# Patient Record
Sex: MALE | Race: ASIAN | Employment: FULL TIME | URBAN - METROPOLITAN AREA
[De-identification: names, ages, dates, MRNs, and addresses within clinical notes are randomized per-mention and may not be internally consistent; named-entity substitution may affect disease eponyms.]

---

## 2018-08-22 ENCOUNTER — HOSPITAL ENCOUNTER (EMERGENCY)
Facility: CLINIC | Age: 33
Discharge: HOME | End: 2018-08-22
Attending: EMERGENCY MEDICINE | Admitting: EMERGENCY MEDICINE
Payer: COMMERCIAL

## 2018-08-22 ENCOUNTER — APPOINTMENT (OUTPATIENT)
Dept: GENERAL RADIOLOGY | Facility: CLINIC | Age: 33
End: 2018-08-22
Attending: EMERGENCY MEDICINE
Payer: COMMERCIAL

## 2018-08-22 VITALS
RESPIRATION RATE: 18 BRPM | HEART RATE: 60 BPM | SYSTOLIC BLOOD PRESSURE: 120 MMHG | OXYGEN SATURATION: 99 % | TEMPERATURE: 97.8 F | DIASTOLIC BLOOD PRESSURE: 80 MMHG

## 2018-08-22 DIAGNOSIS — R07.9 ACUTE CHEST PAIN: ICD-10-CM

## 2018-08-22 DIAGNOSIS — M54.6 ACUTE LEFT-SIDED THORACIC BACK PAIN: ICD-10-CM

## 2018-08-22 LAB
ALBUMIN SERPL-MCNC: 4.3 G/DL (ref 3.4–5)
ALP SERPL-CCNC: 52 U/L (ref 40–150)
ALT SERPL W P-5'-P-CCNC: 33 U/L (ref 0–70)
ANION GAP SERPL CALCULATED.3IONS-SCNC: 4 MMOL/L (ref 3–14)
AST SERPL W P-5'-P-CCNC: 17 U/L (ref 0–45)
BASOPHILS # BLD AUTO: 0.1 10E9/L (ref 0–0.2)
BASOPHILS NFR BLD AUTO: 0.7 %
BILIRUB SERPL-MCNC: 0.6 MG/DL (ref 0.2–1.3)
BUN SERPL-MCNC: 14 MG/DL (ref 7–30)
CALCIUM SERPL-MCNC: 8.8 MG/DL (ref 8.5–10.1)
CHLORIDE SERPL-SCNC: 104 MMOL/L (ref 94–109)
CO2 SERPL-SCNC: 30 MMOL/L (ref 20–32)
CREAT SERPL-MCNC: 0.96 MG/DL (ref 0.66–1.25)
DIFFERENTIAL METHOD BLD: NORMAL
EOSINOPHIL # BLD AUTO: 0.1 10E9/L (ref 0–0.7)
EOSINOPHIL NFR BLD AUTO: 1.6 %
ERYTHROCYTE [DISTWIDTH] IN BLOOD BY AUTOMATED COUNT: 13 % (ref 10–15)
GFR SERPL CREATININE-BSD FRML MDRD: >90 ML/MIN/1.7M2
GLUCOSE SERPL-MCNC: 88 MG/DL (ref 70–99)
HCT VFR BLD AUTO: 45.3 % (ref 40–53)
HGB BLD-MCNC: 15.8 G/DL (ref 13.3–17.7)
IMM GRANULOCYTES # BLD: 0.1 10E9/L (ref 0–0.4)
IMM GRANULOCYTES NFR BLD: 0.7 %
INTERPRETATION ECG - MUSE: NORMAL
LYMPHOCYTES # BLD AUTO: 2.5 10E9/L (ref 0.8–5.3)
LYMPHOCYTES NFR BLD AUTO: 32.5 %
MCH RBC QN AUTO: 29.5 PG (ref 26.5–33)
MCHC RBC AUTO-ENTMCNC: 34.9 G/DL (ref 31.5–36.5)
MCV RBC AUTO: 85 FL (ref 78–100)
MONOCYTES # BLD AUTO: 0.4 10E9/L (ref 0–1.3)
MONOCYTES NFR BLD AUTO: 5.7 %
NEUTROPHILS # BLD AUTO: 4.5 10E9/L (ref 1.6–8.3)
NEUTROPHILS NFR BLD AUTO: 58.8 %
NRBC # BLD AUTO: 0 10*3/UL
NRBC BLD AUTO-RTO: 0 /100
PLATELET # BLD AUTO: 192 10E9/L (ref 150–450)
POTASSIUM SERPL-SCNC: 4.1 MMOL/L (ref 3.4–5.3)
PROT SERPL-MCNC: 7.5 G/DL (ref 6.8–8.8)
RBC # BLD AUTO: 5.36 10E12/L (ref 4.4–5.9)
SODIUM SERPL-SCNC: 139 MMOL/L (ref 133–144)
TROPONIN I SERPL-MCNC: <0.015 UG/L (ref 0–0.04)
WBC # BLD AUTO: 7.7 10E9/L (ref 4–11)

## 2018-08-22 PROCEDURE — 99285 EMERGENCY DEPT VISIT HI MDM: CPT | Mod: GC | Performed by: EMERGENCY MEDICINE

## 2018-08-22 PROCEDURE — 84484 ASSAY OF TROPONIN QUANT: CPT | Performed by: EMERGENCY MEDICINE

## 2018-08-22 PROCEDURE — 71046 X-RAY EXAM CHEST 2 VIEWS: CPT

## 2018-08-22 PROCEDURE — 93005 ELECTROCARDIOGRAM TRACING: CPT | Mod: 59 | Performed by: EMERGENCY MEDICINE

## 2018-08-22 PROCEDURE — 93308 TTE F-UP OR LMTD: CPT | Performed by: EMERGENCY MEDICINE

## 2018-08-22 PROCEDURE — 80053 COMPREHEN METABOLIC PANEL: CPT | Performed by: EMERGENCY MEDICINE

## 2018-08-22 PROCEDURE — 99285 EMERGENCY DEPT VISIT HI MDM: CPT | Mod: 25 | Performed by: EMERGENCY MEDICINE

## 2018-08-22 PROCEDURE — 93308 TTE F-UP OR LMTD: CPT | Mod: 26 | Performed by: EMERGENCY MEDICINE

## 2018-08-22 PROCEDURE — 85025 COMPLETE CBC W/AUTO DIFF WBC: CPT | Performed by: EMERGENCY MEDICINE

## 2018-08-22 NOTE — ED PROVIDER NOTES
"  History     Chief Complaint   Patient presents with     Chest Pain     HPI  Phuc Velasco is a 33 year old male with history of heartburn and one syncopal event a year ago, who presents over 24 hours after onset of left upper back discomfort.    Patient had an episode of light-headedness and pre-syncope on Friday while standing for over an hour at a concert. No chest pain, SOB, palpitations, nausea or profuse diaphoresis.He had gone to dinner previously and had 3 beers, no drug use. He sat down and vomited his dinner, saw EMS there, who monitored and recommended hydration. Patient was driven home by friend and had been feeling well until yesterday around noon when he noticed a discomfort \"like someone is pushing on my heart from the back\". Denies actual chest pain, but instead reports a pressure sensation on his left upper back. It has been stable for over 24 hours, max intensity 2-3/10, with no radiations. Improves when lying flat on the bed or standing and walking around, worse when sitting up. No changes in pain with bending down or twisting torso. He has not vomited since Friday. No SOB, palpitations, cough, runny nose, fevers, abdominal pain, urinary or GI symptoms. 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   Comes in because his father is a doctor and has a history of a \"heart condition\" at his age (not MI, but may be arrhythmia, patient is not sure).    I have reviewed the Medications, Allergies, Past Medical and Surgical History, and Social History in the Epic system.    Review of Systems   ROS: 10 point ROS neg other than the symptoms noted above in the HPI.    Physical Exam     Patient Vitals for the past 24 hrs:   BP Temp Temp src Pulse Resp SpO2   08/22/18 1130 (!) 136/100 - - - - 97 %   08/22/18 1100 (!) 125/114 - - - - 95 %   08/22/18 1045 (!) 136/110 - - - - 97 %   08/22/18 1016 (!) 134/104 97.8  F (36.6  C) Oral 67 18 100 %     Physical Exam  Constitutional: overweight. patient is " "resting on bed.   Hent:   Head: atraumatic.   Eyes: conjunctivae are normal.  Neck: normal range of motion.   Cardiovascular: normal rate and regular rhythm. No murmurs noted. No tenderness to palpation of thorax.    Pulmonary/chest: effort normal and breath sounds normal. No respiratory distress.   Abdominal: soft. No tenderness. The patient has no guarding.   Musculoskeletal: normal range of motion. The patient exhibits no edema.   Neurological: The patient is alert and oriented to person, place, and time. GCS 15.  Skin: skin is warm and dry. No rash noted.   Psychiatric: The patient has a normal mood and affect. The patient's behavior is normal.    ED Course     ED Interventions:  Medications - No data to display     ED Course:  I reviewed the patient's medical record.   The patient was seen and examined by myself. I discussed the course of care with the patient including laboratory and diagnostic studies.    He understands and is agreeable to the plan.  Recheck. Stable.      Procedures             EKG Interpretation:      Interpreted by Talya Pollard  Time reviewed: 1040  Symptoms at time of EKG: persistent left upper back \"pressure\"   Rhythm: normal sinus   Rate: normal  Axis: normal  Ectopy: none  Conduction: normal  ST Segments/ T Waves: No ST-T wave changes  Q Waves: none  Comparison to prior: Unchanged from 8/30/2016    Clinical Impression: normal EKG    Labs Ordered and Resulted from Time of ED Arrival Up to the Time of Departure from the ED   CBC WITH PLATELETS DIFFERENTIAL   COMPREHENSIVE METABOLIC PANEL   TROPONIN I     XR Chest 2 Views         POC US ECHO LIMITED    (Results Pending)            Assessments & Plan (with Medical Decision Making)     HEART Score  Criteria   0-2 points for each of 5 items (maximum of 10 points):  Score 0- History with low suspicion for coronary syndrome  Score 0- EKG Normal  Score 0- Age <45 years old  Score 0- No risk factors for atherosclerotic disease  Score 0- " "Within normal limits for troponin levels  Interpretation  Heart Score: 0 - Adverse Outcome Risk 2.5% - Supports early discharge with appropriate follow-up    Phuc Velasco is a 33 year old male presented with left upper back pain that he described as \"pushing towards the heart\". Initial laboratory and imaging tests have come back normal, including CBC, BMP and CXR. Troponin after >24 h is negative. The patients symptoms has been stable since onset and during his visit he was hemodynamically stable. There is no clinical, laboratory, or radiographic evidence of pneumonia, pulmonary embolism, aortic dissection or cardiac ischemia, pleurisy or musculoskeletal origin of the pain. Given the low HEART score, I feel the candidate is appropriate for discharge. Recommended follow-up with PCP to discuss cardiac stress test and return if any worsening.    I have reviewed the findings, diagnosis, plan and need for follow up with the patient. He understands and agrees with plan. Discharged in stable condition.    New Prescriptions    No medications on file     Final diagnoses:   Acute left-sided thoracic back pain       8/22/2018   [facility], [place], EMERGENCY DEPARTMENT     Talya Pollard MD  Resident  08/22/18 1994    "

## 2018-08-22 NOTE — ED AVS SNAPSHOT
" [facility], Emergency Department    [address] [facility] [address] [address]    Phone:  [phone]                                       Phuc Velasco   MRN: [id number]    Department:  [facility], Emergency Department   Date of Visit:  8/22/2018           Patient Information     Date Of Birth          1985        Your diagnoses for this visit were:     Acute left-sided thoracic back pain        You were seen by Eryn Montero MD.        Discharge Instructions       ECG, chest X-ray, cardiac markers, point of care cardiac US, CBC and CMP were normal today.  Take tylenol or ibuprofen for back pain, you can try ice or heat.  Return to ED if severe \"tearing\" back pain, left chest pain/pressure, loss of consciousness, difficulty breathing, leg swelling, upper abdominal pain or other worrisome symptoms.   Otherwise follow-up with your doctor to discuss cardiac stress test or other further testing.    *CHEST PAIN    Based on your visit today, the exact cause of your chest pain is not certain. Your condition does not seem serious and your pain does not appear to be coming from your heart. However, sometimes the signs of a serious problem take more time to appear. Therefore, please watch for the warning signs listed below.  HOME CARE:  1. Rest today and avoid strenuous activity.  2. Take any prescribed medicine as directed.  FOLLOW UP with your doctor in 1-3 days.   GET PROMPT MEDICAL ATTENTION if any of the following occur:    A change in the type of pain: if it feels different, becomes more severe, lasts longer, or begins to spread into your shoulder, arm, neck, jaw or back    Shortness of breath or increased pain with breathing    Cough with blood or dark colored sputum (phlegm)    Weakness, dizziness, or fainting    Fever over 101  F (38.3  C)    Swelling, pain or redness in one leg    7416-2457 [employer]. [address] [place] [address], [place] [address]. All rights reserved. This information " is not intended as a substitute for professional medical care. Always follow your healthcare professional's instructions.  This information has been modified by your health care provider with permission from the publisher.      24 Hour Appointment Hotline       To make an appointment at any [facility], call [phone] ([phone]). If you don't have a family doctor or clinic, we will help you find one. [facility] clinics are conveniently located to serve the needs of you and your family.             Review of your medicines      Our records show that you are taking the medicines listed below. If these are incorrect, please call your family doctor or clinic.        Dose / Directions Last dose taken    ASPIRIN PO   Dose:  81 mg        Take 81 mg by mouth daily   Refills:  0                Procedures and tests performed during your visit     CBC with platelets differential    Comprehensive metabolic panel    EKG 12 lead    POC US ECHO LIMITED    Troponin I    XR Chest 2 Views      Orders Needing Specimen Collection     None      Pending Results     Date and Time Order Name Status Description    8/22/2018 1302 POC US ECHO LIMITED In process     8/22/2018 1133 XR Chest 2 Views Preliminary             Pending Culture Results     No orders found from 8/20/2018 to 8/23/2018.            Pending Results Instructions     If you had any lab results that were not finalized at the time of your Discharge, you can call the ED Lab Result RN at [phone]. You will be contacted by this team for any positive Lab results or changes in treatment. The nurses are available 7 days a week from 10A to 6:30P.  You can leave a message 24 hours per day and they will return your call.        Thank you for choosing [facility]       Thank you for choosing [facility] for your care. Our goal is always to provide you with excellent care. Hearing back from our patients is one way we can continue to improve our services. Please take a few  "minutes to complete the written survey that you may receive in the mail after you visit with us. Thank you!        [employer] Information     [employer] lets you send messages to your doctor, view your test results, renew your prescriptions, schedule appointments and more. To sign up, go to [email or web address] . Click on \"Log in\" on the left side of the screen, which will take you to the Welcome page. Then click on \"Sign up Now\" on the right side of the page.     You will be asked to enter the access code listed below, as well as some personal information. Please follow the directions to create your username and password.     Your access code is: [email or web address]  Expires: 2018  1:33 PM     Your access code will  in 90 days. If you need help or a new code, please call your [facility] clinic or [phone].        Care EveryWhere ID     This is your Care EveryWhere ID. This could be used by other organizations to access your [facility] medical records  [id number]        Equal Access to Services     [facility]: Hadii adryan parkinsono Sostarr, waaxda luqadaha, qaybta kaalmamonty gallegos, jessica franklin . So [facility] [phone].    ATENCIÓN: Si habla español, tiene a parikh disposición servicios gratuitos de asistencia lingüística. Llame al [phone].    We comply with applicable federal civil rights laws and [place] laws. We do not discriminate on the basis of race, color, national origin, age, disability, sex, sexual orientation, or gender identity.            After Visit Summary       This is your record. Keep this with you and show to your community pharmacist(s) and doctor(s) at your next visit.                  "

## 2018-08-22 NOTE — ED AVS SNAPSHOT
[facility], [place], Emergency Department    [address] [place] [address]    Phone:  [phone]                                       Phuc Velasco   MRN: [id number]    Department:  [facility], Emergency Department   Date of Visit:  8/22/2018           After Visit Summary Signature Page     I have received my discharge instructions, and my questions have been answered. I have discussed any challenges I see with this plan with the nurse or doctor.    ..........................................................................................................................................  Patient/Patient Representative Signature      ..........................................................................................................................................  Patient Representative Print Name and Relationship to Patient    ..................................................               ................................................  Date                                            Time    ..........................................................................................................................................  Reviewed by Signature/Title    ...................................................              ..............................................  Date                                                            Time

## 2018-08-22 NOTE — DISCHARGE INSTRUCTIONS
"ECG, chest X-ray, cardiac markers, point of care cardiac US, CBC and CMP were normal today.  Take tylenol or ibuprofen for back pain, you can try ice or heat.  Return to ED if severe \"tearing\" back pain, left chest pain/pressure, loss of consciousness, difficulty breathing, leg swelling, upper abdominal pain or other worrisome symptoms.   Otherwise follow-up with your doctor to discuss cardiac stress test or other further testing.    *CHEST PAIN    Based on your visit today, the exact cause of your chest pain is not certain. Your condition does not seem serious and your pain does not appear to be coming from your heart. However, sometimes the signs of a serious problem take more time to appear. Therefore, please watch for the warning signs listed below.  HOME CARE:  1. Rest today and avoid strenuous activity.  2. Take any prescribed medicine as directed.  FOLLOW UP with your doctor in 1-3 days.   GET PROMPT MEDICAL ATTENTION if any of the following occur:    A change in the type of pain: if it feels different, becomes more severe, lasts longer, or begins to spread into your shoulder, arm, neck, jaw or back    Shortness of breath or increased pain with breathing    Cough with blood or dark colored sputum (phlegm)    Weakness, dizziness, or fainting    Fever over 101  F (38.3  C)    Swelling, pain or redness in one leg    5715-9983 [employer]. [address] [place], [place] [address]. All rights reserved. This information is not intended as a substitute for professional medical care. Always follow your healthcare professional's instructions.  This information has been modified by your health care provider with permission from the publisher.    "

## 2018-08-22 NOTE — ED TRIAGE NOTES
Pt presents to ED with chest pain and left arm weakness that has been going on since Friday. Pt states he was at a concert on Friday and felt like he was going to faint, his vision began to black out and he vomited. Pt states since then he has had chest pain on and off. Pt states the pain is most noticeable when he is sitting up. Pt has been taking 81 mg of aspirin that last few days prophylacticly.

## 2021-05-04 ENCOUNTER — HOSPITAL ENCOUNTER (EMERGENCY)
Facility: CLINIC | Age: 36
Discharge: HOME | End: 2021-05-04
Attending: NURSE PRACTITIONER | Admitting: NURSE PRACTITIONER
Payer: COMMERCIAL

## 2021-05-04 ENCOUNTER — APPOINTMENT (OUTPATIENT)
Dept: GENERAL RADIOLOGY | Facility: CLINIC | Age: 36
End: 2021-05-04
Attending: NURSE PRACTITIONER
Payer: COMMERCIAL

## 2021-05-04 VITALS
TEMPERATURE: 96.8 F | OXYGEN SATURATION: 98 % | HEART RATE: 84 BPM | RESPIRATION RATE: 24 BRPM | SYSTOLIC BLOOD PRESSURE: 150 MMHG | DIASTOLIC BLOOD PRESSURE: 84 MMHG

## 2021-05-04 DIAGNOSIS — R07.9 CHEST PAIN: ICD-10-CM

## 2021-05-04 LAB
ANION GAP SERPL CALCULATED.3IONS-SCNC: 3 MMOL/L (ref 3–14)
BASOPHILS # BLD AUTO: 0.1 10E9/L (ref 0–0.2)
BASOPHILS NFR BLD AUTO: 1.2 %
BUN SERPL-MCNC: 16 MG/DL (ref 7–30)
CALCIUM SERPL-MCNC: 9.2 MG/DL (ref 8.5–10.1)
CHLORIDE SERPL-SCNC: 103 MMOL/L (ref 94–109)
CO2 SERPL-SCNC: 30 MMOL/L (ref 20–32)
CREAT SERPL-MCNC: 1.07 MG/DL (ref 0.66–1.25)
DIFFERENTIAL METHOD BLD: NORMAL
EOSINOPHIL # BLD AUTO: 0.2 10E9/L (ref 0–0.7)
EOSINOPHIL NFR BLD AUTO: 1.6 %
ERYTHROCYTE [DISTWIDTH] IN BLOOD BY AUTOMATED COUNT: 12.8 % (ref 10–15)
GFR SERPL CREATININE-BSD FRML MDRD: 89 ML/MIN/{1.73_M2}
GLUCOSE SERPL-MCNC: 91 MG/DL (ref 70–99)
HCT VFR BLD AUTO: 49.4 % (ref 40–53)
HGB BLD-MCNC: 16.7 G/DL (ref 13.3–17.7)
IMM GRANULOCYTES # BLD: 0.1 10E9/L (ref 0–0.4)
IMM GRANULOCYTES NFR BLD: 0.7 %
INTERPRETATION ECG - MUSE: NORMAL
LYMPHOCYTES # BLD AUTO: 3 10E9/L (ref 0.8–5.3)
LYMPHOCYTES NFR BLD AUTO: 28.1 %
MCH RBC QN AUTO: 28.3 PG (ref 26.5–33)
MCHC RBC AUTO-ENTMCNC: 33.8 G/DL (ref 31.5–36.5)
MCV RBC AUTO: 84 FL (ref 78–100)
MONOCYTES # BLD AUTO: 0.8 10E9/L (ref 0–1.3)
MONOCYTES NFR BLD AUTO: 7.7 %
NEUTROPHILS # BLD AUTO: 6.5 10E9/L (ref 1.6–8.3)
NEUTROPHILS NFR BLD AUTO: 60.7 %
NRBC # BLD AUTO: 0 10*3/UL
NRBC BLD AUTO-RTO: 0 /100
PLATELET # BLD AUTO: 276 10E9/L (ref 150–450)
POTASSIUM SERPL-SCNC: 4.2 MMOL/L (ref 3.4–5.3)
RBC # BLD AUTO: 5.9 10E12/L (ref 4.4–5.9)
SODIUM SERPL-SCNC: 136 MMOL/L (ref 133–144)
TROPONIN I SERPL-MCNC: <0.015 UG/L (ref 0–0.04)
WBC # BLD AUTO: 10.7 10E9/L (ref 4–11)

## 2021-05-04 PROCEDURE — 93005 ELECTROCARDIOGRAM TRACING: CPT

## 2021-05-04 PROCEDURE — 84484 ASSAY OF TROPONIN QUANT: CPT | Performed by: EMERGENCY MEDICINE

## 2021-05-04 PROCEDURE — 80048 BASIC METABOLIC PNL TOTAL CA: CPT | Performed by: EMERGENCY MEDICINE

## 2021-05-04 PROCEDURE — 99285 EMERGENCY DEPT VISIT HI MDM: CPT | Mod: 25

## 2021-05-04 PROCEDURE — 85025 COMPLETE CBC W/AUTO DIFF WBC: CPT | Performed by: EMERGENCY MEDICINE

## 2021-05-04 PROCEDURE — 71046 X-RAY EXAM CHEST 2 VIEWS: CPT

## 2021-05-04 ASSESSMENT — ENCOUNTER SYMPTOMS
COUGH: 0
SHORTNESS OF BREATH: 0
NUMBNESS: 1

## 2021-05-04 NOTE — ED TRIAGE NOTES
"Patient woke around midnight last night with a \"weight\" on his chest, also notes tingling in left arm. Patient notes worse pain this afternoon.  "

## 2021-05-04 NOTE — ED PROVIDER NOTES
"  History   Chief Complaint:  Chest Pressure    HPI   Phuc Velasco is a 36 year old male who presents with chest pain. The patient reports waking up this morning at 0100 with chest pressure that prevented him from sleeping. He went to work today with mild alleviation but after 1200 he states that his hands became \"cold and numb.\" He called his father who has a history of arrhythmia and he advised to go to the ED for evaluation. He states that his chest pressure felt like a \"5-10 pound weight in her chest\" but has since resolved 30 minutes ago. He denies personal heart or lung history. He does not smoke. He does admit to having a couple of drinks last night. He denies cough and shortness of breath.     Review of Systems   Respiratory: Negative for cough and shortness of breath.    Cardiovascular: Positive for chest pain (resolved).   Neurological: Positive for numbness (left hand (resloved)).   All other systems reviewed and are negative.      Allergies:  Penicillins    Medications:  The patient is not currently taking any prescribed medications.    Past Medical History:    Retinal detachment    Past Surgical History:    ENT Surgery    Family History  Arrhythmia    Social History:  The patient presents by himself.  The patient does drink alcohol  The patient does not smoke  Physical Exam     Patient Vitals for the past 24 hrs:   BP Temp Temp src Pulse Resp SpO2   05/04/21 1830 -- -- -- 84 24 --   05/04/21 1800 -- -- -- 78 25 --   05/04/21 1745 -- -- -- 81 26 --   05/04/21 1736 -- -- -- 76 23 --   05/04/21 1454 (!) 150/84 96.8  F (36  C) Temporal 70 18 98 %       Physical Exam  General: Alert, No obvious discomfort, well kept  Eyes: PERRL, conjunctivae pink no scleral icterus or conjunctival injection  ENT:   Moist mucus membranes, posterior oropharynx clear without erythema or exudates, No lymphadenopathy, Normal voice  Resp:  Lungs clear to auscultation bilaterally, no crackles/rubs/wheezes. Good air movement  CV: "  Normal rate and rhythm, no murmurs/rubs/gallops  GI:  Abdomen soft and non-distended.  Normoactive BS.  No tenderness, guarding or rebound, No masses  Skin:  Warm, dry.  No rashes or petechiae  Musculoskeletal: No peripheral edema or calf tenderness, Normal gross ROM   Neuro: Alert and oriented to person/place/time, normal sensation  Psychiatric: Mildly anxious, cooperative, good eye contact    Emergency Department Course   ECG (14:58:05):  Rate 73 bpm. NV interval 156. QRS duration 88. QT/QTc 380/418. P-R-T axes 55 68 23. Normal sinus rhythm. Nonspecific T wave abnormality. Abnormal EKG. Interpreted at 1729 by Emiliano Antunez MD.    Imaging:    X-ray Chest, 2 views:  No acute findings. The lungs are clear and there are no   pleural effusions. Normal heart size.   Result per radiology.     Laboratory:    CBC: WBC: 10.7, HGB: 16.7, PLT: 276  BMP:  WNL (Creatinine: 1.07)  Troponin (Collected 1719): <0.015    Emergency Department Course:    Reviewed:    I reviewed the patient's nursing notes, vitals, past medical records, Care Everywhere.     Assessments:    1730: I performed an exam of the patient and obtained history, as documented above.    1844: I rechecked the patient and updated them on findings. They are amenable to discharge.     Disposition:  The patient was discharged to home.       Impression & Plan   Medical Decision Making:  Phuc Velasco is a 36 year old male who presents today for evaluation of left-sided chest discomfort.  This has been ongoing since 9 AM this morning.  Due to continued symptoms and a paresthesia in his left arm he presented for evaluation after discussing with his father.  His examination here shows subjective paresthesia that had resolved.  He no longer had chest discomfort on evaluation.  He was concerned that he may have had stress causing this.  However given his father's history of arrhythmia he was concerned and presented for evaluation.  He had a nonacute EKG with a negative  troponin.  He has a HEART score of one putting him at the lowest possible risk for major cardiac event.  Given the duration of his symptoms and a negative troponin and a delta was not indicated.  Chest x-ray showed no acute findings.  Given this scenario I doubt PE.  The remainder his laboratory studies were noncontributory.  He has never had a stress test therefore one was placed in the system for him.  He appears to be safe and appropriate for outpatient management follow-up and is discharged home.  Strict return protocols were discussed.      Diagnosis:    ICD-10-CM    1. Chest pain  R07.9 Echo Stress Echocardiogram       Scribe Disclosure:  IAria, am serving as a scribe at 5:25 PM on 5/4/2021 to document services personally performed by Jovanny Mckenna APRN based on my observations and the provider's statements to me.         Jovanny Mckenna APRN CNP  05/04/21 8992

## 2021-06-13 ENCOUNTER — HEALTH MAINTENANCE LETTER (OUTPATIENT)
Age: 36
End: 2021-06-13

## 2021-09-06 ENCOUNTER — HOSPITAL ENCOUNTER (EMERGENCY)
Facility: CLINIC | Age: 36
Discharge: LEFT BEFORE DISCHARGE | End: 2021-09-06
Payer: COMMERCIAL

## 2021-09-06 VITALS
HEART RATE: 83 BPM | WEIGHT: 210 LBS | BODY MASS INDEX: 35.85 KG/M2 | TEMPERATURE: 98.2 F | OXYGEN SATURATION: 97 % | HEIGHT: 64 IN | SYSTOLIC BLOOD PRESSURE: 136 MMHG | DIASTOLIC BLOOD PRESSURE: 97 MMHG | RESPIRATION RATE: 16 BRPM

## 2021-09-06 ASSESSMENT — MIFFLIN-ST. JEOR: SCORE: 1798.55
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
## 2021-10-03 ENCOUNTER — HEALTH MAINTENANCE LETTER (OUTPATIENT)
Age: 36
End: 2021-10-03

## 2022-07-10 ENCOUNTER — HEALTH MAINTENANCE LETTER (OUTPATIENT)
Age: 37
End: 2022-07-10

## 2022-09-10 ENCOUNTER — HEALTH MAINTENANCE LETTER (OUTPATIENT)
Age: 37
End: 2022-09-10

## 2023-01-16 ENCOUNTER — OFFICE VISIT (OUTPATIENT)
Dept: OPHTHALMOLOGY | Facility: CLINIC | Age: 38
End: 2023-01-16
Attending: STUDENT IN AN ORGANIZED HEALTH CARE EDUCATION/TRAINING PROGRAM
Payer: COMMERCIAL

## 2023-01-16 DIAGNOSIS — H33.311 HORSESHOE RETINAL TEAR, RIGHT EYE: ICD-10-CM

## 2023-01-16 DIAGNOSIS — Z98.890 HISTORY OF DETACHED RETINA REPAIR: ICD-10-CM

## 2023-01-16 DIAGNOSIS — Z98.890 HISTORY OF DETACHED RETINA REPAIR: Primary | ICD-10-CM

## 2023-01-16 DIAGNOSIS — Z86.69 HISTORY OF DETACHED RETINA REPAIR: ICD-10-CM

## 2023-01-16 DIAGNOSIS — Z86.69 HISTORY OF DETACHED RETINA REPAIR: Primary | ICD-10-CM

## 2023-01-16 DIAGNOSIS — H52.13 MYOPIA OF BOTH EYES WITH ASTIGMATISM: ICD-10-CM

## 2023-01-16 DIAGNOSIS — H33.322 ROUND HOLE OF LEFT RETINA WITHOUT DETACHMENT: ICD-10-CM

## 2023-01-16 DIAGNOSIS — H52.203 MYOPIA OF BOTH EYES WITH ASTIGMATISM: ICD-10-CM

## 2023-01-16 PROCEDURE — 92250 FUNDUS PHOTOGRAPHY W/I&R: CPT | Performed by: STUDENT IN AN ORGANIZED HEALTH CARE EDUCATION/TRAINING PROGRAM

## 2023-01-16 PROCEDURE — G0463 HOSPITAL OUTPT CLINIC VISIT: HCPCS | Mod: 25

## 2023-01-16 PROCEDURE — 92004 COMPRE OPH EXAM NEW PT 1/>: CPT | Performed by: STUDENT IN AN ORGANIZED HEALTH CARE EDUCATION/TRAINING PROGRAM

## 2023-01-16 PROCEDURE — 92134 CPTRZ OPH DX IMG PST SGM RTA: CPT | Performed by: STUDENT IN AN ORGANIZED HEALTH CARE EDUCATION/TRAINING PROGRAM

## 2023-01-16 PROCEDURE — 99207 FUNDUS PHOTOS OU (BOTH EYES): CPT | Mod: 26 | Performed by: STUDENT IN AN ORGANIZED HEALTH CARE EDUCATION/TRAINING PROGRAM

## 2023-01-16 RX ORDER — ALLOPURINOL 300 MG/1
1 TABLET ORAL
COMMUNITY
Start: 2022-08-08

## 2023-01-16 ASSESSMENT — EXTERNAL EXAM - LEFT EYE: OS_EXAM: WNL

## 2023-01-16 ASSESSMENT — REFRACTION_WEARINGRX
OS_SPHERE: -6.50
OD_CYLINDER: +1.75
OD_SPHERE: -7.00
OD_AXIS: 083
OS_AXIS: 020
SPECS_TYPE: SVL
OS_CYLINDER: +0.75

## 2023-01-16 ASSESSMENT — CONF VISUAL FIELD
OD_SUPERIOR_TEMPORAL_RESTRICTION: 0
METHOD: COUNTING FINGERS
OD_INFERIOR_NASAL_RESTRICTION: 0
OD_INFERIOR_TEMPORAL_RESTRICTION: 0
OD_SUPERIOR_NASAL_RESTRICTION: 0
OD_NORMAL: 1

## 2023-01-16 ASSESSMENT — VISUAL ACUITY
OD_CC: 20/15
OS_CC: J1+-2
OD_CC: J1+-2
OS_CC+: -2
OS_CC: 20/15
METHOD: SNELLEN - LINEAR

## 2023-01-16 ASSESSMENT — SLIT LAMP EXAM - LIDS
COMMENTS: WNL
COMMENTS: WNL

## 2023-01-16 ASSESSMENT — EXTERNAL EXAM - RIGHT EYE: OD_EXAM: WNL

## 2023-01-16 ASSESSMENT — TONOMETRY
IOP_METHOD: TONOPEN
OD_IOP_MMHG: 17
OS_IOP_MMHG: 18

## 2023-01-16 NOTE — PROGRESS NOTES
HPI     Retinal Evaluation    In right eye.  Onset was sudden.  Occurring constantly.  Associated symptoms include Negative for dryness, eye pain, flashes, floaters, itching, discharge and burning.  Pain was noted as 0/10.           Comments    Phuc is here new to clinic for evaluation of a pinpoint bubble re after retinal detachment surgery (2009) approx.     Fermin Freedd COT 8:12 AM January 16, 2023              Last edited by Fermin Franco on 1/16/2023  8:12 AM.          Review of systems for the eyes was negative other than the pertinent positives/negatives listed in the HPI.    Ocular Meds: none    Ocular Hx: scleral buckle/cryo/gas OD (2009) at [facility] with Dr Han    FOHx: grandmother - 2 RDs     PMHx: none    SocHx: trust and estate ; dad pathologist    Assessment & Plan      Phuc Velasco is a 37 year old male with the following diagnoses:    1. History of detached retina repair - Right Eye    2. Horseshoe retinal tear, right eye    3. Round hole of left retina without detachment    4. Myopia of both eyes with astigmatism       Presents for concern of single intermittent floater in right eye for several months; no new flashes or curtain coming down visual field  History of RD s/p repair at [facility] (2009)  BCVA excellent  ?history of trauma, +moderate high myope  optos consistent with exam and oct macula obtained and reviewed today  Noted to have HST OD with residual perfluoron; retina appears attached OD with demarcation line noted temporally near areas of CR scar  Left eye incidentally noted to have 2 hold small operculated holes with pigment; discussed possibility of retinopexy given history of RD in fellow eye    Will have patient return to see retina next available for ?perfluoron bubble noted and HST OD and old operculated holes left eye    Counseled return/RD precautions    Patient disposition:   Return for Follow Up next available with retina, or sooner changes.      Attending Physician  Attestation:  Complete documentation of historical and exam elements from today's encounter can be found in the full encounter summary report (not reduplicated in this progress note).  I personally obtained the chief complaint(s) and history of present illness.  I confirmed and edited as necessary the review of systems, past medical/surgical history, family history, social history, and examination findings as documented by others; and I examined the patient myself.  I personally reviewed the relevant tests, images, and reports as documented above.  I formulated and edited as necessary the assessment and plan and discussed the findings and management plan with the patient and family. . - Cristina Taylor MD

## 2023-01-16 NOTE — NURSING NOTE
Chief Complaints and History of Present Illnesses   Patient presents with     Retinal Evaluation     Chief Complaint(s) and History of Present Illness(es)     Retinal Evaluation            Laterality: right eye    Onset: sudden    Frequency: constantly    Associated symptoms: Negative for dryness, eye pain, flashes, floaters, itching, discharge and burning    Pain scale: 0/10          Comments    Phuc is here new to clinic for evaluation of a pinpoint bubble re after retinal detachment surgery (2009) approx.     Fermin Salvador COT 8:12 AM January 16, 2023

## 2023-01-17 ENCOUNTER — OFFICE VISIT (OUTPATIENT)
Dept: OPHTHALMOLOGY | Facility: CLINIC | Age: 38
End: 2023-01-17
Attending: OPHTHALMOLOGY
Payer: COMMERCIAL

## 2023-01-17 DIAGNOSIS — H33.311 RETINAL TEAR OF RIGHT EYE: ICD-10-CM

## 2023-01-17 DIAGNOSIS — H33.322 PERIPHERAL RETINAL HOLE OF LEFT EYE: ICD-10-CM

## 2023-01-17 DIAGNOSIS — H52.13 HIGH MYOPIA, BILATERAL: ICD-10-CM

## 2023-01-17 DIAGNOSIS — H33.001 RETINAL DETACHMENT OF RIGHT EYE WITH RETINAL BREAK: Primary | ICD-10-CM

## 2023-01-17 PROCEDURE — 92134 CPTRZ OPH DX IMG PST SGM RTA: CPT | Performed by: OPHTHALMOLOGY

## 2023-01-17 PROCEDURE — 67145 PROPH RTA DTCHMNT PC: CPT | Mod: RT | Performed by: OPHTHALMOLOGY

## 2023-01-17 PROCEDURE — 99214 OFFICE O/P EST MOD 30 MIN: CPT | Mod: 25 | Performed by: OPHTHALMOLOGY

## 2023-01-17 PROCEDURE — G0463 HOSPITAL OUTPT CLINIC VISIT: HCPCS | Mod: 25

## 2023-01-17 ASSESSMENT — SLIT LAMP EXAM - LIDS
COMMENTS: WNL
COMMENTS: WNL

## 2023-01-17 ASSESSMENT — VISUAL ACUITY
CORRECTION_TYPE: GLASSES
OS_CC+: +1
OS_CC: 20/20
METHOD: SNELLEN - LINEAR
OD_CC+: +2
OD_CC: 20/20

## 2023-01-17 ASSESSMENT — REFRACTION_WEARINGRX
OS_SPHERE: -6.50
OS_AXIS: 020
OS_CYLINDER: +0.75
OD_AXIS: 083
OD_SPHERE: -7.00
SPECS_TYPE: SVL
OD_CYLINDER: +1.75

## 2023-01-17 ASSESSMENT — TONOMETRY
IOP_METHOD: ICARE
OS_IOP_MMHG: 16
OD_IOP_MMHG: 14

## 2023-01-17 ASSESSMENT — CONF VISUAL FIELD
OS_NORMAL: 1
OS_SUPERIOR_TEMPORAL_RESTRICTION: 0
OD_INFERIOR_NASAL_RESTRICTION: 0
OS_SUPERIOR_NASAL_RESTRICTION: 0
OD_INFERIOR_TEMPORAL_RESTRICTION: 0
OD_NORMAL: 1
OS_INFERIOR_NASAL_RESTRICTION: 0
OD_SUPERIOR_NASAL_RESTRICTION: 0
METHOD: COUNTING FINGERS
OS_INFERIOR_TEMPORAL_RESTRICTION: 0
OD_SUPERIOR_TEMPORAL_RESTRICTION: 0

## 2023-01-17 ASSESSMENT — EXTERNAL EXAM - LEFT EYE: OS_EXAM: WNL

## 2023-01-17 ASSESSMENT — EXTERNAL EXAM - RIGHT EYE: OD_EXAM: WNL

## 2023-01-17 NOTE — PROGRESS NOTES
CC -   Retinal holes    INTERVAL HISTORY - Initial visit    HPI -   Phuc Velasco is a  37 year old year-old patient presenting for evaluation of retinal holes found by Dr. Taylor. He presented to Dr. Taylor's clinic yesterday for evaluation of a bubble he noticed in his vision in the right eye. He had a retinal detachment in his right eye 13 years ago treated with SB/cryo/laser/gas bubble. Dr. Taylor noted a small bubble of perfluoron. During exam he was noted to have two small operculated holes in the left eye so he was sent here for retinal evaluation and possible laser retinopexy of these two lesions. He denies floaters or hx of surgery in the left eye .       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; excellent results but with small PFO bubble  - remnants of PHF remained partially attached     # HST, right eye  - it is overlying the buckle with only attachment to residual vitreous skirt  - it is low chance of propagation posteriorly, however prophylactic laser would be beneficial; will do barricade laser today     # Operculated holes, left eye  - two (one SN, one inferior) small, pigmented operculated holes with no SRF  - recommend observation with strict return precautions  - PHF attached     # High myopia, both eyes  - annual MRx      return to clinic: 4 weeks; DFE and OCT and optos ou     Stella Romero MD  Ophthalmology Resident, PGY-3  [facility]      Complete documentation  of historical and exam elements from today's encounter can be found in the full encounter summary report (not reduplicated in this progress note). I personally obtained the chief complaint(s) and history of present illness.  I confirmed and edited as necessary the review of systems, past medical/surgical history, family history, social history, and examination findings as documented by others; and I examined the patient myself. I personally reviewed the relevant tests, images, and reports as documented above. I formulated and edited as necessary the assessment and plan and discussed the findings and management plan with the patient and family.     Reyes Lorenz MD, PhD

## 2023-01-17 NOTE — NURSING NOTE
Chief Complaints and History of Present Illnesses   Patient presents with     New Patient     Chief Complaint(s) and History of Present Illness(es)     New Patient            Laterality: both eyes    Associated symptoms: Negative for flashes and floaters    Treatments tried: no treatments    Pain scale: 0/10          Comments    New retina evaluation for horseshoe tear right eye and left eye small operculated holes.  The patient has no noticeable vision changes.  Katie Mcnair COA, COA 7:20 AM 01/17/2023

## 2023-01-31 DIAGNOSIS — H33.001 RETINAL DETACHMENT OF RIGHT EYE WITH RETINAL BREAK: Primary | ICD-10-CM

## 2023-02-14 ENCOUNTER — OFFICE VISIT (OUTPATIENT)
Dept: OPHTHALMOLOGY | Facility: CLINIC | Age: 38
End: 2023-02-14
Attending: OPHTHALMOLOGY
Payer: COMMERCIAL

## 2023-02-14 DIAGNOSIS — H33.311 RETINAL TEAR OF RIGHT EYE: ICD-10-CM

## 2023-02-14 DIAGNOSIS — H33.322 PERIPHERAL RETINAL HOLE OF LEFT EYE: ICD-10-CM

## 2023-02-14 DIAGNOSIS — Z86.69 HISTORY OF DETACHED RETINA REPAIR: ICD-10-CM

## 2023-02-14 DIAGNOSIS — H52.13 HIGH MYOPIA, BILATERAL: ICD-10-CM

## 2023-02-14 DIAGNOSIS — Z98.890 HISTORY OF DETACHED RETINA REPAIR: ICD-10-CM

## 2023-02-14 DIAGNOSIS — H33.001 RETINAL DETACHMENT OF RIGHT EYE WITH RETINAL BREAK: Primary | ICD-10-CM

## 2023-02-14 PROCEDURE — 92134 CPTRZ OPH DX IMG PST SGM RTA: CPT | Performed by: OPHTHALMOLOGY

## 2023-02-14 PROCEDURE — G0463 HOSPITAL OUTPT CLINIC VISIT: HCPCS | Mod: 25

## 2023-02-14 PROCEDURE — 92250 FUNDUS PHOTOGRAPHY W/I&R: CPT | Performed by: OPHTHALMOLOGY

## 2023-02-14 PROCEDURE — 99207 FUNDUS PHOTOS OU (BOTH EYES): CPT | Mod: 26 | Performed by: OPHTHALMOLOGY

## 2023-02-14 PROCEDURE — 99214 OFFICE O/P EST MOD 30 MIN: CPT | Mod: GC | Performed by: OPHTHALMOLOGY

## 2023-02-14 ASSESSMENT — REFRACTION_WEARINGRX
OD_AXIS: 083
OS_CYLINDER: +0.75
OD_SPHERE: -7.00
OD_CYLINDER: +1.75
OS_SPHERE: -6.50
SPECS_TYPE: SVL
OS_AXIS: 020

## 2023-02-14 ASSESSMENT — CONF VISUAL FIELD
OD_SUPERIOR_NASAL_RESTRICTION: 0
OD_INFERIOR_NASAL_RESTRICTION: 0
METHOD: COUNTING FINGERS
OD_NORMAL: 1
OS_SUPERIOR_TEMPORAL_RESTRICTION: 0
OS_INFERIOR_TEMPORAL_RESTRICTION: 0
OS_INFERIOR_NASAL_RESTRICTION: 0
OS_NORMAL: 1
OD_SUPERIOR_TEMPORAL_RESTRICTION: 0
OD_INFERIOR_TEMPORAL_RESTRICTION: 0
OS_SUPERIOR_NASAL_RESTRICTION: 0

## 2023-02-14 ASSESSMENT — SLIT LAMP EXAM - LIDS
COMMENTS: WNL
COMMENTS: WNL

## 2023-02-14 ASSESSMENT — EXTERNAL EXAM - LEFT EYE: OS_EXAM: WNL

## 2023-02-14 ASSESSMENT — EXTERNAL EXAM - RIGHT EYE: OD_EXAM: WNL

## 2023-02-14 ASSESSMENT — VISUAL ACUITY
OS_CC+: -2
METHOD: SNELLEN - LINEAR
OS_CC: 20/20
OD_CC: 20/20
CORRECTION_TYPE: GLASSES

## 2023-02-14 ASSESSMENT — TONOMETRY
OS_IOP_MMHG: 17
OD_IOP_MMHG: 16
IOP_METHOD: TONOPEN

## 2023-02-14 NOTE — NURSING NOTE
Chief Complaints and History of Present Illnesses   Patient presents with     Retinal Detachment Follow Up     Chief Complaint(s) and History of Present Illness(es)     Retinal Detachment Follow Up            Laterality: right eye    Duration: 4 weeks    Associated symptoms: floaters.  Negative for headaches    Pain scale: 0/10          Comments    Pt notes stable vision since laser treatment.  No other changes since last seen.     HERSON DEAN February 14, 2023 7:47 AM

## 2023-02-14 NOTE — PROGRESS NOTES
CC -   Retinal holes    INTERVAL HISTORY - 1 month follow up. Denies any changes in vision since his last visit. Notices stable floaters. Denies flashes.     HPI -   Phuc Velasco is a  37 year old patient presenting for evaluation of retinal holes found by Dr. Taylor. He presented to Dr. Taylor's clinic yesterday for evaluation of a bubble he noticed in his vision in the right eye. He had a retinal detachment in his right eye 13 years ago treated with SB/cryo/laser/gas bubble. Dr. Taylor noted a small bubble of perfluoron. During exam he was noted to have two small operculated holes in the left eye so he was sent here for retinal evaluation and possible laser retinopexy of these two lesions. He denies floaters or hx of surgery in the left eye .       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; excellent results but with small PFO bubble remaining  - remnants of PHF remained partially attached     # HST, right eye, first noted 1/2023  - it is overlying the buckle with only attachment to residual vitreous skirt  - it is low chance of propagation posteriorly, however treated with prophylactic barricade laser 1/2023  - No propagation today on exam     # Operculated holes, left eye  - two (one SN, one inferior) small, pigmented operculated holes with no SRF  - PHF attached; exam stable today  - recommend observation with strict return precautions    # High myopia, both eyes  -  annual MRx      return to clinic: 6 months; DFE and OCT and optos ou  If stable, then yearly exam     Flory Narayanan MD  Resident Physician - PGY3  Department of Ophthalmology   [facility]    Complete documentation of historical and exam elements from today's encounter can be found in the full encounter summary report (not reduplicated in this progress note). I personally obtained the chief complaint(s) and history of present illness.  I confirmed and edited as necessary the review of systems, past medical/surgical history, family history, social history, and examination findings as documented by others; and I examined the patient myself. I personally reviewed the relevant tests, images, and reports as documented above. I formulated and edited as necessary the assessment and plan and discussed the findings and management plan with the patient and family.     Reyes Lorenz MD, PhD

## 2023-07-23 ENCOUNTER — HEALTH MAINTENANCE LETTER (OUTPATIENT)
Age: 38
End: 2023-07-23

## 2023-08-02 DIAGNOSIS — H33.001 RETINAL DETACHMENT OF RIGHT EYE WITH RETINAL BREAK: Primary | ICD-10-CM

## 2023-08-15 ENCOUNTER — OFFICE VISIT (OUTPATIENT)
Dept: OPHTHALMOLOGY | Facility: CLINIC | Age: 38
End: 2023-08-15
Attending: OPHTHALMOLOGY
Payer: COMMERCIAL

## 2023-08-15 DIAGNOSIS — Z86.69 HISTORY OF DETACHED RETINA REPAIR: ICD-10-CM

## 2023-08-15 DIAGNOSIS — H33.001 RETINAL DETACHMENT OF RIGHT EYE WITH RETINAL BREAK: ICD-10-CM

## 2023-08-15 DIAGNOSIS — H33.311 RETINAL TEAR OF RIGHT EYE: ICD-10-CM

## 2023-08-15 DIAGNOSIS — H33.322 PERIPHERAL RETINAL HOLE OF LEFT EYE: Primary | ICD-10-CM

## 2023-08-15 DIAGNOSIS — Z98.890 HISTORY OF DETACHED RETINA REPAIR: ICD-10-CM

## 2023-08-15 DIAGNOSIS — H52.13 HIGH MYOPIA, BILATERAL: ICD-10-CM

## 2023-08-15 DIAGNOSIS — H33.311 HORSESHOE RETINAL TEAR, RIGHT EYE: ICD-10-CM

## 2023-08-15 PROCEDURE — 92250 FUNDUS PHOTOGRAPHY W/I&R: CPT | Performed by: OPHTHALMOLOGY

## 2023-08-15 PROCEDURE — 92134 CPTRZ OPH DX IMG PST SGM RTA: CPT | Performed by: OPHTHALMOLOGY

## 2023-08-15 PROCEDURE — G0463 HOSPITAL OUTPT CLINIC VISIT: HCPCS | Performed by: OPHTHALMOLOGY

## 2023-08-15 PROCEDURE — 99214 OFFICE O/P EST MOD 30 MIN: CPT | Mod: GC | Performed by: OPHTHALMOLOGY

## 2023-08-15 PROCEDURE — 99207 FUNDUS PHOTOS OU (BOTH EYES): CPT | Mod: 26 | Performed by: OPHTHALMOLOGY

## 2023-08-15 ASSESSMENT — CONF VISUAL FIELD
OD_INFERIOR_NASAL_RESTRICTION: 0
OS_SUPERIOR_NASAL_RESTRICTION: 0
OS_SUPERIOR_TEMPORAL_RESTRICTION: 0
OD_SUPERIOR_TEMPORAL_RESTRICTION: 0
OS_INFERIOR_TEMPORAL_RESTRICTION: 0
METHOD: COUNTING FINGERS
OD_SUPERIOR_NASAL_RESTRICTION: 0
OS_INFERIOR_NASAL_RESTRICTION: 0
OS_NORMAL: 1
OD_INFERIOR_TEMPORAL_RESTRICTION: 0
OD_NORMAL: 1

## 2023-08-15 ASSESSMENT — TONOMETRY
IOP_METHOD: TONOPEN
OD_IOP_MMHG: 14
OS_IOP_MMHG: 13

## 2023-08-15 ASSESSMENT — REFRACTION_WEARINGRX
OD_AXIS: 096
OD_CYLINDER: +1.00
OS_SPHERE: -6.50
OD_SPHERE: -6.50
OS_AXIS: 009
OS_CYLINDER: +0.75
SPECS_TYPE: SVL

## 2023-08-15 ASSESSMENT — EXTERNAL EXAM - RIGHT EYE: OD_EXAM: WNL

## 2023-08-15 ASSESSMENT — SLIT LAMP EXAM - LIDS
COMMENTS: WNL
COMMENTS: WNL

## 2023-08-15 ASSESSMENT — EXTERNAL EXAM - LEFT EYE: OS_EXAM: WNL

## 2023-08-15 ASSESSMENT — VISUAL ACUITY
OD_CC+: -2
OD_CC: 20/20
CORRECTION_TYPE: GLASSES
METHOD: SNELLEN - LINEAR
OS_CC: 20/20

## 2023-08-15 ASSESSMENT — CUP TO DISC RATIO
OS_RATIO: 0.3
OD_RATIO: 0.3

## 2023-08-15 NOTE — NURSING NOTE
Chief Complaints and History of Present Illnesses   Patient presents with    Retinal Detachment Follow Up     6 month follow up retinal detachment of right eye with retinal break      Chief Complaint(s) and History of Present Illness(es)       Retinal Detachment Follow Up              Associated symptoms: Negative for floaters    Comments: 6 month follow up retinal detachment of right eye with retinal break               Comments    Pt states RE feels good. No pain, flashes, floaters or vision changes.   Per pt no drops currently being used    Greer Patton OA 3:15 PM August 15, 2023

## 2023-08-15 NOTE — PROGRESS NOTES
CC -   Retinal holes    INTERVAL HISTORY - 6 month follow up, no new floaters. No flashing lights. Vision is stable.    HPI -   Phuc Velasco is a  38 year old patient presenting for evaluation of retinal holes found by Dr. Taylor. He presented to Dr. Taylor's clinic yesterday for evaluation of a bubble he noticed in his vision in the right eye. He had a retinal detachment in his right eye 13 years ago treated with SB/cryo/laser/gas bubble. Dr. Taylor noted a small bubble of perfluoron. During exam he was noted to have two small operculated holes in the left eye so he was sent here for retinal evaluation and possible laser retinopexy of these two lesions. He denies floaters or hx of surgery in the left eye .     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; excellent results but with small PFO bubble remaining  - remnants of PHF remained partially attached     # HST, right eye, first noted 1/2023  - it is overlying the buckle with only attachment to residual vitreous skirt  - it is low chance of propagation posteriorly, however treated with prophylactic barricade laser 1/2023  - No propagation today on exam     # Operculated holes, left eye  - two (one SN, one inferior) small, pigmented operculated holes with no SRF  - PHF attached; exam stable today  - recommend observation with strict return precautions    # High myopia, both eyes  - annual  MRx      return to clinic: 12 months; DFE and OCT and optos ou    Thank you for entrusting us with your care  Lisa Maciel MD, PGY3  Ophthalmology Resident  [facility]    Complete documentation of historical and exam elements from today's encounter can be found in the full encounter summary report (not reduplicated in this progress note). I personally obtained the chief complaint(s) and history of present illness.  I confirmed and edited as necessary the review of systems, past medical/surgical history, family history, social history, and examination findings as documented by others; and I examined the patient myself. I personally reviewed the relevant tests, images, and reports as documented above. I formulated and edited as necessary the assessment and plan and discussed the findings and management plan with the patient and family.     Reyes Lorenz MD, PhD

## 2024-01-17 ENCOUNTER — OFFICE VISIT (OUTPATIENT)
Dept: OPHTHALMOLOGY | Facility: CLINIC | Age: 39
End: 2024-01-17
Attending: OPHTHALMOLOGY
Payer: COMMERCIAL

## 2024-01-17 DIAGNOSIS — H52.13 HIGH MYOPIA, BILATERAL: ICD-10-CM

## 2024-01-17 DIAGNOSIS — H33.322 PERIPHERAL RETINAL HOLE OF LEFT EYE: ICD-10-CM

## 2024-01-17 DIAGNOSIS — H33.311 RETINAL TEAR OF RIGHT EYE: ICD-10-CM

## 2024-01-17 DIAGNOSIS — H33.001 RETINAL DETACHMENT OF RIGHT EYE WITH RETINAL BREAK: Primary | ICD-10-CM

## 2024-01-17 PROCEDURE — 99214 OFFICE O/P EST MOD 30 MIN: CPT | Mod: GC | Performed by: OPHTHALMOLOGY

## 2024-01-17 PROCEDURE — 99207 FUNDUS PHOTOS OU (BOTH EYES): CPT | Mod: 26 | Performed by: OPHTHALMOLOGY

## 2024-01-17 PROCEDURE — 92134 CPTRZ OPH DX IMG PST SGM RTA: CPT | Performed by: OPHTHALMOLOGY

## 2024-01-17 PROCEDURE — 99214 OFFICE O/P EST MOD 30 MIN: CPT | Performed by: OPHTHALMOLOGY

## 2024-01-17 PROCEDURE — 92250 FUNDUS PHOTOGRAPHY W/I&R: CPT | Performed by: OPHTHALMOLOGY

## 2024-01-17 ASSESSMENT — SLIT LAMP EXAM - LIDS
COMMENTS: WNL
COMMENTS: WNL

## 2024-01-17 ASSESSMENT — REFRACTION_WEARINGRX
OD_CYLINDER: +1.00
OD_AXIS: 085
OS_AXIS: 179
OD_SPHERE: -6.50
OS_CYLINDER: +1.00
OS_SPHERE: -6.50

## 2024-01-17 ASSESSMENT — EXTERNAL EXAM - LEFT EYE: OS_EXAM: WNL

## 2024-01-17 ASSESSMENT — VISUAL ACUITY
OD_CC: 20/20
OS_CC: 20/20
OS_CC: J1+
METHOD: SNELLEN - LINEAR
OD_CC: J1

## 2024-01-17 ASSESSMENT — REFRACTION_MANIFEST
OS_SPHERE: -6.25
OD_AXIS: 056
OS_AXIS: 180
OD_CYLINDER: +0.75
OD_SPHERE: -6.50
OS_CYLINDER: +0.50

## 2024-01-17 ASSESSMENT — TONOMETRY
OS_IOP_MMHG: 18
OD_IOP_MMHG: 17
IOP_METHOD: TONOPEN

## 2024-01-17 ASSESSMENT — CONF VISUAL FIELD
OS_INFERIOR_TEMPORAL_RESTRICTION: 0
OS_SUPERIOR_NASAL_RESTRICTION: 0
OS_NORMAL: 1
OS_INFERIOR_NASAL_RESTRICTION: 0
METHOD: COUNTING FINGERS
OS_SUPERIOR_TEMPORAL_RESTRICTION: 0

## 2024-01-17 ASSESSMENT — EXTERNAL EXAM - RIGHT EYE: OD_EXAM: WNL

## 2024-01-17 ASSESSMENT — CUP TO DISC RATIO
OS_RATIO: 0.3
OD_RATIO: 0.3

## 2024-01-17 NOTE — NURSING NOTE
Chief Complaints and History of Present Illnesses   Patient presents with    Post Op (Ophthalmology) Right Eye     Chief Complaint(s) and History of Present Illness(es)       Post Op (Ophthalmology) Right Eye              Laterality: right eye    Associated symptoms: Negative for dryness, eye pain, flashes, floaters and itching    Pain scale: 0/10              Comments    Phuc is here post right eye retinal detachment repair (2-14-23), complaining of blurred vision at near with right eye over the past couple of months. He says reading near with RE is very difficult with glasses on. Left eye seems fine. Current glasses are less than one year old ([employer] Angel).    Fermin Franco COT 3:06 PM January 17, 2024

## 2024-01-17 NOTE — PROGRESS NOTES
CC -   Retinal holes    INTERVAL HISTORY - Phuc is here post right eye retinal detachment repair (2-14-23), complaining of blurred vision at near with right eye over the past couple of months. He says reading near with RE is very difficult with glasses on. Left eye seems fine. Current glasses are less than one year old ([employer] Angel). Denies curtains, flashing lights, floaters    HPI -   Phuc Velasco is a  38 year old patient presenting for evaluation of retinal holes found by Dr. Taylor. He presented to Dr. Taylor's clinic yesterday for evaluation of a bubble he noticed in his vision in the right eye. He had a retinal detachment in his right eye 13 years ago treated with SB/cryo/laser/gas bubble. Dr. Taylor noted a small bubble of perfluoron. During exam he was noted to have two small operculated holes in the left eye so he was sent here for retinal evaluation and possible laser retinopexy of these two lesions. He denies floaters or hx of surgery in the left eye .     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; excellent results but with small PFO bubble remaining  - remnants of PHF remained partially attached     # HST, right eye, first noted 1/2023  - it is overlying the buckle with only attachment to residual vitreous skirt  - it is low chance of propagation posteriorly, however treated with prophylactic barricade laser 1/2023  - No propagation today on exam     # Operculated holes, left  eye  - two (one SN, one inferior) small, pigmented operculated holes with no SRF  - PHF attached; exam stable today  - recommend observation with strict return precautions    # High myopia, both eyes  - annual Mrx    #Trace NS OD  -Slight refractive change with minimal early NS changes     return to clinic: 12 months; DFE and OCT and optos ou    Aiden Machado MD MPH  Vitreoretinal Fellow PGY-6  [facility]     Complete documentation of historical and exam elements from today's encounter can be found in the full encounter summary report (not reduplicated in this progress note). I personally obtained the chief complaint(s) and history of present illness.  I confirmed and edited as necessary the review of systems, past medical/surgical history, family history, social history, and examination findings as documented by others; and I examined the patient myself. I personally reviewed the relevant tests, images, and reports as documented above. I formulated and edited as necessary the assessment and plan and discussed the findings and management plan with the patient and family.     Reyes Lorenz MD, PhD

## 2024-01-17 NOTE — NURSING NOTE
Chief Complaints and History of Present Illnesses   Patient presents with    Post Op (Ophthalmology) Right Eye     Chief Complaint(s) and History of Present Illness(es)       Post Op (Ophthalmology) Right Eye              Laterality: right eye    Associated symptoms: Negative for dryness, eye pain, flashes, floaters and itching    Pain scale: 0/10              Comments    Phuc is here post right eye retinal detachment repair (2-14-23), complaining of blurred vision at near with right eye over the past couple of months. He says reading near with RE is very difficult with glasses on. Left eye seems fine. Current glasses are less than one year old.     Fermin Franco COT 2:42 PM January 17, 2024

## 2024-08-05 DIAGNOSIS — H33.001 RETINAL DETACHMENT OF RIGHT EYE WITH RETINAL BREAK: Primary | ICD-10-CM

## 2024-08-20 ENCOUNTER — OFFICE VISIT (OUTPATIENT)
Dept: OPHTHALMOLOGY | Facility: CLINIC | Age: 39
End: 2024-08-20
Attending: OPHTHALMOLOGY
Payer: COMMERCIAL

## 2024-08-20 DIAGNOSIS — Z86.69 HISTORY OF DETACHED RETINA REPAIR: ICD-10-CM

## 2024-08-20 DIAGNOSIS — H33.322 PERIPHERAL RETINAL HOLE OF LEFT EYE: Primary | ICD-10-CM

## 2024-08-20 DIAGNOSIS — H33.311 RETINAL TEAR OF RIGHT EYE: ICD-10-CM

## 2024-08-20 DIAGNOSIS — H52.13 HIGH MYOPIA, BILATERAL: ICD-10-CM

## 2024-08-20 DIAGNOSIS — Z98.890 HISTORY OF DETACHED RETINA REPAIR: ICD-10-CM

## 2024-08-20 DIAGNOSIS — H33.001 RETINAL DETACHMENT OF RIGHT EYE WITH RETINAL BREAK: ICD-10-CM

## 2024-08-20 DIAGNOSIS — H33.311 HORSESHOE RETINAL TEAR, RIGHT EYE: ICD-10-CM

## 2024-08-20 DIAGNOSIS — H53.10 SUBJECTIVE VISION DISTURBANCE: ICD-10-CM

## 2024-08-20 PROCEDURE — 92250 FUNDUS PHOTOGRAPHY W/I&R: CPT | Mod: 59 | Performed by: OPHTHALMOLOGY

## 2024-08-20 PROCEDURE — 99213 OFFICE O/P EST LOW 20 MIN: CPT | Performed by: OPHTHALMOLOGY

## 2024-08-20 PROCEDURE — 99214 OFFICE O/P EST MOD 30 MIN: CPT | Performed by: OPHTHALMOLOGY

## 2024-08-20 PROCEDURE — 92134 CPTRZ OPH DX IMG PST SGM RTA: CPT | Performed by: OPHTHALMOLOGY

## 2024-08-20 PROCEDURE — 99207 FUNDUS PHOTOS OU (BOTH EYES): CPT | Mod: 26 | Performed by: OPHTHALMOLOGY

## 2024-08-20 ASSESSMENT — CUP TO DISC RATIO
OS_RATIO: 0.3
OD_RATIO: 0.3

## 2024-08-20 ASSESSMENT — VISUAL ACUITY
OS_CC: 20/20
METHOD: SNELLEN - LINEAR
OD_CC: 20/20
CORRECTION_TYPE: GLASSES

## 2024-08-20 ASSESSMENT — TONOMETRY
OS_IOP_MMHG: 17
IOP_METHOD: TONOPEN
OD_IOP_MMHG: 17

## 2024-08-20 ASSESSMENT — CONF VISUAL FIELD
OS_INFERIOR_NASAL_RESTRICTION: 0
OD_NORMAL: 1
OD_INFERIOR_TEMPORAL_RESTRICTION: 0
OS_SUPERIOR_TEMPORAL_RESTRICTION: 0
OS_INFERIOR_TEMPORAL_RESTRICTION: 0
OD_INFERIOR_NASAL_RESTRICTION: 0
OS_NORMAL: 1
OD_SUPERIOR_NASAL_RESTRICTION: 0
OD_SUPERIOR_TEMPORAL_RESTRICTION: 0
OS_SUPERIOR_NASAL_RESTRICTION: 0

## 2024-08-20 ASSESSMENT — REFRACTION_WEARINGRX
OD_AXIS: 085
OS_AXIS: 179
OS_CYLINDER: +1.00
OD_SPHERE: -6.50
SPECS_TYPE: SVD
OD_CYLINDER: +1.00
OS_SPHERE: -6.50

## 2024-08-20 ASSESSMENT — EXTERNAL EXAM - RIGHT EYE: OD_EXAM: WNL

## 2024-08-20 ASSESSMENT — SLIT LAMP EXAM - LIDS
COMMENTS: WNL
COMMENTS: WNL

## 2024-08-20 ASSESSMENT — EXTERNAL EXAM - LEFT EYE: OS_EXAM: WNL

## 2024-08-20 NOTE — NURSING NOTE
Chief Complaints and History of Present Illnesses   Patient presents with    Macular Hole Follow Up     Patient scheduled visit for Vision Change right eye      Chief Complaint(s) and History of Present Illness(es)       Macular Hole Follow Up              Laterality: left eye    Associated symptoms: floaters.  Negative for eye pain, flashes and burning    Treatments tried: no treatments    Comments: Patient scheduled visit for Vision Change right eye               Comments    Patient reports decreased vision, described as 50% worse, in right eye past 2-3 weeks, with new floaters. No flashes.  Pt reports ache around right eye.  Patient reports left eye stable.   Ocular Meds: none per pt    Dari Jules OA 2:20 PM August 20, 2024

## 2024-08-20 NOTE — PROGRESS NOTES
CC -   Retinal holes    INTERVAL HISTORY - vision is more blurry right eye x 1 month    HPI -   Phuc Velasco is a  39 year old patient presenting for evaluation of retinal holes found by Dr. Taylor. He presented to Dr. Taylor's clinic yesterday for evaluation of a bubble he noticed in his vision in the right eye. He had a retinal detachment in his right eye 13 years ago treated with SB/cryo/laser/gas bubble. Dr. Taylor noted a small bubble of perfluoron. During exam he was noted to have two small operculated holes in the left eye so he was sent here for retinal evaluation and possible laser retinopexy of these two lesions. He denies floaters or hx of surgery in the left eye .     PAST OCULAR SURGERY  2010 PPV/SB/cryo/laser/gas bubble for RD repair right eye ([facility])    RETINAL IMAGING:  OCT mac 01/17/24 and 8/20/24  OD - normal retinal contour and thickness, posterior hyaloid face detached but visible-stable  OS - normal retinal contour and thickness, slightly thin choroid, attached posterior hyaloid-stable    Optos photos 01/17/24 and 8/20/24  OU consistent with exam    ASSESSMENT & PLAN    # subjective obscuration of vision right eye  Floaters slightly more than before but no flashing  Retinal examination and OCT within normal limits and stable  I don't have an explanation for blurred vision    # Hx of retinal detachment in right eye  - family hx (grandmother, recently)  - high myopia both eyes  - S/P PPV/SB right eye 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; excellent results but with small PFO bubble remaining  - remnants of PHF remained partially attached     # HST, right eye, first noted 1/2023  - it is overlying the buckle with only attachment to residual vitreous skirt  - it is low chance of propagation posteriorly, however treated with prophylactic barricade laser 1/2023  - No propagation today on exam     # Operculated holes, left eye  - two (one SN, one inferior) small, pigmented operculated holes  with no SRF  - PHF attached; exam stable today  - recommend observation with strict return precautions    # High myopia, both eyes  - annual Mrx    #Trace NS OD  -Slight refractive change with minimal early NS changes     return to clinic: he will update his glasses and will update me about the symptoms in 1-2 months and Follow-up in clinic in 12 months; DFE and OCT and optos ou    Complete documentation of historical and exam elements from today's encounter can be found in the full encounter summary report (not reduplicated in this progress note). I personally obtained the chief complaint(s) and history of present illness.  I confirmed and edited as necessary the review of systems, past medical/surgical history, family history, social history, and examination findings as documented by others; and I examined the patient myself. I personally reviewed the relevant tests, images, and reports as documented above. I formulated and edited as necessary the assessment and plan and discussed the findings and management plan with the patient and family.     Reyes Lorenz MD, PhD

## 2024-08-20 NOTE — NURSING NOTE
Chief Complaints and History of Present Illnesses   Patient presents with    Macular Hole Follow Up     12 month follow up for Operculated holes, left eye      Chief Complaint(s) and History of Present Illness(es)       Macular Hole Follow Up              Laterality: left eye    Associated symptoms: floaters.  Negative for eye pain, flashes and burning    Comments: 12 month follow up for Operculated holes, left eye               Comments    Patient reports decreased vision, described as 50% worse, in right eye past 2-3 weeks, with new floaters.  Patient reports left eye stable.  No flashes.  Pt reports ache around right eye.   Ocular Meds: none per pt    Dari Jules OA 2:20 PM August 20, 2024

## 2024-09-15 ENCOUNTER — HEALTH MAINTENANCE LETTER (OUTPATIENT)
Age: 39
End: 2024-09-15

## 2025-03-13 ENCOUNTER — OFFICE VISIT (OUTPATIENT)
Dept: OPHTHALMOLOGY | Facility: CLINIC | Age: 40
End: 2025-03-13
Payer: COMMERCIAL

## 2025-03-13 DIAGNOSIS — H33.322 PERIPHERAL RETINAL HOLE OF LEFT EYE: ICD-10-CM

## 2025-03-13 DIAGNOSIS — Z98.890 HISTORY OF DETACHED RETINA REPAIR: Primary | ICD-10-CM

## 2025-03-13 DIAGNOSIS — H52.13 HIGH MYOPIA, BILATERAL: ICD-10-CM

## 2025-03-13 DIAGNOSIS — Z86.69 HISTORY OF DETACHED RETINA REPAIR: Primary | ICD-10-CM

## 2025-03-13 DIAGNOSIS — H33.311 RETINAL TEAR OF RIGHT EYE: ICD-10-CM

## 2025-03-13 DIAGNOSIS — H53.10 SUBJECTIVE VISION DISTURBANCE: ICD-10-CM

## 2025-03-13 ASSESSMENT — KERATOMETRY
OD_K1POWER_DIOPTERS: 7.91
OD_AXISANGLE2_DEGREES: 001
OD_K2POWER_DIOPTERS: 7.63

## 2025-03-13 ASSESSMENT — EXTERNAL EXAM - LEFT EYE: OS_EXAM: WNL

## 2025-03-13 ASSESSMENT — CUP TO DISC RATIO
OS_RATIO: 0.3
OD_RATIO: 0.3

## 2025-03-13 ASSESSMENT — SLIT LAMP EXAM - LIDS
COMMENTS: NORMAL
COMMENTS: NORMAL

## 2025-03-13 ASSESSMENT — REFRACTION
OS_CYLINDER: +0.75
OS_AXIS: 025
OD_SPHERE: -7.75
OD_CYLINDER: +1.25
OD_AXIS: 091
OD_SPHERE: -9.50
OD_SPHERE: -9.75
OD_AXIS: 097
OD_CYLINDER: +0.25
OD_CYLINDER: +0.25
OS_SPHERE: -6.25
OD_AXIS: 090

## 2025-03-13 ASSESSMENT — CONF VISUAL FIELD
OD_NORMAL: 1
OS_INFERIOR_TEMPORAL_RESTRICTION: 0
METHOD: COUNTING FINGERS
OS_SUPERIOR_NASAL_RESTRICTION: 0
OD_INFERIOR_NASAL_RESTRICTION: 0
OS_SUPERIOR_TEMPORAL_RESTRICTION: 0
OD_SUPERIOR_TEMPORAL_RESTRICTION: 0
OD_SUPERIOR_NASAL_RESTRICTION: 0
OS_INFERIOR_NASAL_RESTRICTION: 0
OS_NORMAL: 1
OD_INFERIOR_TEMPORAL_RESTRICTION: 0

## 2025-03-13 ASSESSMENT — VISUAL ACUITY
OD_CC: 20/40
METHOD: SNELLEN - LINEAR
CORRECTION_TYPE: GLASSES
OD_CC: J2
OD_CC+: -2
OS_CC: 20/20
OD_BAT_HIGH: 20/50-
OD_PH_CC: 20/25
OS_BAT_HIGH: 20/20
OS_CC: J1+
OS_CC+: -1

## 2025-03-13 ASSESSMENT — REFRACTION_WEARINGRX
OD_SPHERE: -6.50
OS_SPHERE: -6.50
OD_AXIS: 085
SPECS_TYPE: SVD
OD_CYLINDER: +1.00
OS_CYLINDER: +1.00
OS_AXIS: 179

## 2025-03-13 ASSESSMENT — REFRACTION_MANIFEST
OS_SPHERE: -6.50
OD_SPHERE: -6.25
OD_CYLINDER: +1.00
OS_AXIS: 006
OD_AXIS: 092
OS_CYLINDER: +1.00

## 2025-03-13 ASSESSMENT — TONOMETRY
OS_IOP_MMHG: 16
OD_IOP_MMHG: 16
IOP_METHOD: ICARE

## 2025-03-13 ASSESSMENT — EXTERNAL EXAM - RIGHT EYE: OD_EXAM: WNL

## 2025-03-13 NOTE — NURSING NOTE
Chief Complaints and History of Present Illnesses   Patient presents with    COMPREHENSIVE EYE EXAM     Chief Complaint(s) and History of Present Illness(es)       COMPREHENSIVE EYE EXAM              Treatments tried: no treatments    Pain scale: 0/10              Comments    Concerns with Cataract right eye. Retinal surgery in the past and was told eventually this would need to be addressed. Patient reports that vision is blurry both distance and near with right eye the past year. Difficult to focus with right eye. Vision left eye remains stable and seeing fine.   No issues with any floater or flashes.     Cristy Antunez, COT COT 7:22 AM March 13, 2025

## 2025-03-13 NOTE — PROGRESS NOTES
A/P  1.) Decreased vision right eye  -H/o retinal detachment with RD repair/scleral buckle  -Has noticed decreasing vision over past year right eye. No new flashes/floaters  -BCVA 20/25- to 20/30 range with myopic shift.   -Clinically there does not appear to be much lenticular opacification but he does BAT down to 20/50 and has a refractive shift correlating to lenticular changes  -Macula flat/intact right eye. No change from last year. Reassuringly pinholes to 20/25  -Did note vision changes right eye last retina exam 08/2024. Given symptomatic decrease in vision would rec re-eval with retina to discuss whether possible lenticular changes would warrant intervention at this time vs observation in a higher risk retina.   -Alternatively may consider topography @ PWB right eye to r/o corneal changes    2.) H/o retinal detachment  -Retinas flat/stable today    3.) High Myopia OU  -Left eye stable, right eye mild myopic shift.     Follow-up retina for re-eval    I have confirmed the patient's CC, HPI and reviewed Past Medical History, Past Surgical History, Social History, Family History, Problem List, Medication List and agree with Tech note.     Jeny Arreola, MARCELO CAMPOSS

## 2025-03-20 DIAGNOSIS — H33.311 RETINAL TEAR OF RIGHT EYE: Primary | ICD-10-CM

## 2025-03-26 ENCOUNTER — OFFICE VISIT (OUTPATIENT)
Dept: OPHTHALMOLOGY | Facility: CLINIC | Age: 40
End: 2025-03-26
Attending: OPHTHALMOLOGY
Payer: COMMERCIAL

## 2025-03-26 DIAGNOSIS — H33.311 RETINAL TEAR OF RIGHT EYE: ICD-10-CM

## 2025-03-26 DIAGNOSIS — Z86.69 HISTORY OF DETACHED RETINA REPAIR: ICD-10-CM

## 2025-03-26 DIAGNOSIS — H52.223 REGULAR ASTIGMATISM OF BOTH EYES: ICD-10-CM

## 2025-03-26 DIAGNOSIS — H52.13 HIGH MYOPIA, BILATERAL: Primary | ICD-10-CM

## 2025-03-26 DIAGNOSIS — H53.10 SUBJECTIVE VISION DISTURBANCE: ICD-10-CM

## 2025-03-26 DIAGNOSIS — H33.322 PERIPHERAL RETINAL HOLE OF LEFT EYE: ICD-10-CM

## 2025-03-26 DIAGNOSIS — Z98.890 HISTORY OF DETACHED RETINA REPAIR: ICD-10-CM

## 2025-03-26 PROCEDURE — 92134 CPTRZ OPH DX IMG PST SGM RTA: CPT | Performed by: OPHTHALMOLOGY

## 2025-03-26 PROCEDURE — 99213 OFFICE O/P EST LOW 20 MIN: CPT | Performed by: OPHTHALMOLOGY

## 2025-03-26 PROCEDURE — 92250 FUNDUS PHOTOGRAPHY W/I&R: CPT | Performed by: OPHTHALMOLOGY

## 2025-03-26 PROCEDURE — 92025 CPTRIZED CORNEAL TOPOGRAPHY: CPT | Performed by: OPHTHALMOLOGY

## 2025-03-26 ASSESSMENT — CONF VISUAL FIELD
OD_INFERIOR_TEMPORAL_RESTRICTION: 0
OS_INFERIOR_TEMPORAL_RESTRICTION: 0
OD_NORMAL: 1
OD_SUPERIOR_TEMPORAL_RESTRICTION: 0
METHOD: COUNTING FINGERS
OD_INFERIOR_NASAL_RESTRICTION: 0
OS_SUPERIOR_TEMPORAL_RESTRICTION: 0
OS_INFERIOR_NASAL_RESTRICTION: 0
OS_SUPERIOR_NASAL_RESTRICTION: 0
OS_NORMAL: 1
OD_SUPERIOR_NASAL_RESTRICTION: 0

## 2025-03-26 ASSESSMENT — REFRACTION_WEARINGRX
OS_CYLINDER: +1.00
SPECS_TYPE: SVD
OS_SPHERE: -6.50
OD_SPHERE: -6.50
OD_CYLINDER: +1.00
OS_AXIS: 179
OD_AXIS: 085

## 2025-03-26 ASSESSMENT — VISUAL ACUITY
OD_CC: 20/30
OS_CC: 20/20
OD_CC+: -2
OD_PH_CC: 20/25
CORRECTION_TYPE: GLASSES
METHOD: SNELLEN - LINEAR

## 2025-03-26 ASSESSMENT — TONOMETRY
OD_IOP_MMHG: 12
OS_IOP_MMHG: 15
IOP_METHOD: ICARE

## 2025-03-26 ASSESSMENT — SLIT LAMP EXAM - LIDS
COMMENTS: WNL
COMMENTS: WNL

## 2025-03-26 ASSESSMENT — CUP TO DISC RATIO
OS_RATIO: 0.3
OD_RATIO: 0.3

## 2025-03-26 ASSESSMENT — EXTERNAL EXAM - RIGHT EYE: OD_EXAM: WNL

## 2025-03-26 ASSESSMENT — EXTERNAL EXAM - LEFT EYE: OS_EXAM: WNL

## 2025-03-26 NOTE — PROGRESS NOTES
CC -   Retinal holes    INTERVAL HISTORY - right eye vision is blurry and not as good as before. Was seen by Dr. Arreola last week who noticed refraction has become more myopic right eye and cataract has progressed.     HPI -   Phuc Velasco is a  40 year old patient presenting for evaluation of retinal holes found by Dr. Taylor. He presented to Dr. Taylor's clinic yesterday for evaluation of a bubble he noticed in his vision in the right eye. He had a retinal detachment in his right eye 13 years ago treated with SB/cryo/laser/gas bubble. Dr. Taylor noted a small bubble of perfluoron. During exam he was noted to have two small operculated holes in the left eye so he was sent here for retinal evaluation and possible laser retinopexy of these two lesions. He denies floaters or hx of surgery in the left eye .     PAST OCULAR SURGERY  2010 PPV/SB/cryo/laser/gas bubble for RD repair right eye ([facility])    RETINAL IMAGING:  OCT mac 01/17/24 and 8/20/24 and 3/26/25  OD - normal retinal contour and thickness, posterior hyaloid face detached but visible-stable  OS - normal retinal contour and thickness, slightly thin choroid, attached posterior hyaloid-stable    ASSESSMENT & PLAN    # Subjective obscuration of vision right eye  # NS cataract right eye   Recent vision worsening   NS noticeably worse   Discussed surgery can be done if right eye vision affects his daily activities; wants to try new glasses first  RTC 5-12 months or sooner if needed    # Hx of retinal detachment in right eye  - family hx (grandmother, recently)  - high myopia both eyes  - S/P PPV/SB right eye 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; excellent results but with small PFO bubble remaining  - remnants of PHF remained partially attached     # HST, right eye, first noted 1/2023  - it is overlying the buckle with only attachment to residual vitreous skirt  - it is low chance of propagation posteriorly, however treated with prophylactic  barricade laser 1/2023  - No propagation today on exam     # Operculated holes, left eye  - two (one SN, one inferior) small, pigmented operculated holes with no SRF  - PHF attached; exam stable today  - recommend observation with strict return precautions    # High myopia, both eyes  - new prescription given by Dr. Arreola      return to clinic: he will update his glasses and will update me about the symptoms in 1-2 months and Follow-up in clinic in 12 months; DFE and OCT and optos ou    Complete documentation of historical and exam elements from today's encounter can be found in the full encounter summary report (not reduplicated in this progress note). I personally obtained the chief complaint(s) and history of present illness.  I confirmed and edited as necessary the review of systems, past medical/surgical history, family history, social history, and examination findings as documented by others; and I examined the patient myself. I personally reviewed the relevant tests, images, and reports as documented above. I formulated and edited as necessary the assessment and plan and discussed the findings and management plan with the patient and family.     Reyes Lorenz MD, PhD

## 2025-03-26 NOTE — NURSING NOTE
Chief Complaints and History of Present Illnesses   Patient presents with    Retinal Hole Follow Up     Chief Complaint(s) and History of Present Illness(es)       Retinal Hole Follow Up              Laterality: left eye              Comments    Pt states vision in RE appearing more blurry over the past year, getting worse. No eye pain today. No flashes or floaters.  No redness or dryness. No DM.    VIVEK Singleton March 26, 2025 2:29 PM

## 2025-08-14 DIAGNOSIS — H33.311 RETINAL TEAR OF RIGHT EYE: Primary | ICD-10-CM

## 2025-08-26 ENCOUNTER — OFFICE VISIT (OUTPATIENT)
Dept: OPHTHALMOLOGY | Facility: CLINIC | Age: 40
End: 2025-08-26
Attending: OPHTHALMOLOGY
Payer: COMMERCIAL

## 2025-08-26 DIAGNOSIS — H33.322 PERIPHERAL RETINAL HOLE OF LEFT EYE: ICD-10-CM

## 2025-08-26 DIAGNOSIS — Z98.890 HISTORY OF DETACHED RETINA REPAIR: ICD-10-CM

## 2025-08-26 DIAGNOSIS — H33.311 RETINAL TEAR OF RIGHT EYE: Primary | ICD-10-CM

## 2025-08-26 DIAGNOSIS — Z86.69 HISTORY OF DETACHED RETINA REPAIR: ICD-10-CM

## 2025-08-26 DIAGNOSIS — H52.13 HIGH MYOPIA, BILATERAL: ICD-10-CM

## 2025-08-26 DIAGNOSIS — H53.10 SUBJECTIVE VISION DISTURBANCE: ICD-10-CM

## 2025-08-26 PROCEDURE — 99207 FUNDUS PHOTOS OU (BOTH EYES): CPT | Performed by: OPHTHALMOLOGY

## 2025-08-26 PROCEDURE — 99213 OFFICE O/P EST LOW 20 MIN: CPT | Performed by: OPHTHALMOLOGY

## 2025-08-26 PROCEDURE — 92134 CPTRZ OPH DX IMG PST SGM RTA: CPT | Performed by: OPHTHALMOLOGY

## 2025-08-26 PROCEDURE — 92134 CPTRZ OPH DX IMG PST SGM RTA: CPT | Mod: 26 | Performed by: OPHTHALMOLOGY

## 2025-08-26 PROCEDURE — 92014 COMPRE OPH EXAM EST PT 1/>: CPT | Performed by: OPHTHALMOLOGY

## 2025-08-26 PROCEDURE — 92250 FUNDUS PHOTOGRAPHY W/I&R: CPT | Performed by: OPHTHALMOLOGY

## 2025-08-26 ASSESSMENT — VISUAL ACUITY
METHOD: SNELLEN - LINEAR
CORRECTION_TYPE: GLASSES
OD_PH_CC: 20/30
OD_PH_CC+: -1
OD_CC+: -1
OS_CC: 20/20
OD_CC: 20/80

## 2025-08-26 ASSESSMENT — CUP TO DISC RATIO
OS_RATIO: 0.3
OD_RATIO: 0.3

## 2025-08-26 ASSESSMENT — SLIT LAMP EXAM - LIDS
COMMENTS: WNL
COMMENTS: WNL

## 2025-08-26 ASSESSMENT — TONOMETRY
OS_IOP_MMHG: 15
IOP_METHOD: TONOPEN
OD_IOP_MMHG: 15

## 2025-08-26 ASSESSMENT — EXTERNAL EXAM - LEFT EYE: OS_EXAM: WNL

## 2025-08-26 ASSESSMENT — EXTERNAL EXAM - RIGHT EYE: OD_EXAM: WNL
